# Patient Record
Sex: FEMALE | Race: WHITE | NOT HISPANIC OR LATINO | Employment: UNEMPLOYED | URBAN - METROPOLITAN AREA
[De-identification: names, ages, dates, MRNs, and addresses within clinical notes are randomized per-mention and may not be internally consistent; named-entity substitution may affect disease eponyms.]

---

## 2019-06-13 ENCOUNTER — OFFICE VISIT (OUTPATIENT)
Dept: SURGERY | Facility: CLINIC | Age: 49
End: 2019-06-13
Payer: MEDICARE

## 2019-06-13 VITALS
DIASTOLIC BLOOD PRESSURE: 74 MMHG | TEMPERATURE: 99 F | HEART RATE: 95 BPM | WEIGHT: 207 LBS | BODY MASS INDEX: 35.34 KG/M2 | HEIGHT: 64 IN | SYSTOLIC BLOOD PRESSURE: 118 MMHG

## 2019-06-13 DIAGNOSIS — K31.84 GASTROPARESIS: ICD-10-CM

## 2019-06-13 DIAGNOSIS — K44.9 HIATAL HERNIA: Primary | ICD-10-CM

## 2019-06-13 PROCEDURE — 3008F BODY MASS INDEX DOCD: CPT | Mod: CPTII,S$GLB,, | Performed by: SURGERY

## 2019-06-13 PROCEDURE — 99999 PR PBB SHADOW E&M-EST. PATIENT-LVL III: CPT | Mod: PBBFAC,,, | Performed by: SURGERY

## 2019-06-13 PROCEDURE — 99999 PR PBB SHADOW E&M-EST. PATIENT-LVL III: ICD-10-PCS | Mod: PBBFAC,,, | Performed by: SURGERY

## 2019-06-13 PROCEDURE — 99204 PR OFFICE/OUTPT VISIT, NEW, LEVL IV, 45-59 MIN: ICD-10-PCS | Mod: S$GLB,,, | Performed by: SURGERY

## 2019-06-13 PROCEDURE — 99204 OFFICE O/P NEW MOD 45 MIN: CPT | Mod: S$GLB,,, | Performed by: SURGERY

## 2019-06-13 PROCEDURE — 3008F PR BODY MASS INDEX (BMI) DOCUMENTED: ICD-10-PCS | Mod: CPTII,S$GLB,, | Performed by: SURGERY

## 2019-06-13 RX ORDER — DICYCLOMINE HYDROCHLORIDE 20 MG/1
TABLET ORAL
COMMUNITY
Start: 2019-05-30

## 2019-06-13 RX ORDER — CETIRIZINE HYDROCHLORIDE 10 MG/1
10 TABLET ORAL
COMMUNITY
Start: 2019-06-11 | End: 2019-06-18

## 2019-06-13 RX ORDER — FLUOXETINE HYDROCHLORIDE 20 MG/1
CAPSULE ORAL
Refills: 0 | COMMUNITY
Start: 2019-05-13

## 2019-06-13 RX ORDER — AZITHROMYCIN 250 MG/1
TABLET, FILM COATED ORAL
COMMUNITY

## 2019-06-13 RX ORDER — BUPROPION HYDROCHLORIDE 150 MG/1
TABLET ORAL
Refills: 0 | COMMUNITY
Start: 2019-03-24

## 2019-06-13 RX ORDER — DEXTROMETHORPHAN HYDROBROMIDE, GUAIFENESIN, AND PHENYLEPHRINE HYDROCHLORIDE 17.5; 385; 1 MG/1; MG/1; MG/1
TABLET ORAL
COMMUNITY

## 2019-06-13 RX ORDER — HYDROCORTISONE 1 %
CREAM (GRAM) TOPICAL
COMMUNITY
Start: 2019-06-11 | End: 2020-06-14

## 2019-06-13 RX ORDER — ASPIRIN 81 MG/1
81 TABLET ORAL
COMMUNITY
Start: 2018-10-04

## 2019-06-13 RX ORDER — LEVOTHYROXINE SODIUM 112 UG/1
TABLET ORAL
COMMUNITY

## 2019-06-13 RX ORDER — GABAPENTIN 100 MG/1
CAPSULE ORAL
COMMUNITY
Start: 2018-11-15

## 2019-06-13 RX ORDER — METHOCARBAMOL 500 MG/1
TABLET, FILM COATED ORAL
COMMUNITY
Start: 2019-05-15

## 2019-06-13 RX ORDER — TRIAZOLAM 0.25 MG/1
TABLET ORAL
Refills: 0 | COMMUNITY
Start: 2019-04-02

## 2019-06-13 RX ORDER — ATORVASTATIN CALCIUM 40 MG/1
TABLET, FILM COATED ORAL
COMMUNITY

## 2019-06-13 RX ORDER — FAMOTIDINE 40 MG/1
40 TABLET, FILM COATED ORAL
COMMUNITY
Start: 2019-06-11 | End: 2019-06-16

## 2019-06-13 RX ORDER — BUTALBITAL, ACETAMINOPHEN AND CAFFEINE 50; 325; 40 MG/1; MG/1; MG/1
TABLET ORAL
COMMUNITY
Start: 2019-04-15

## 2019-06-13 RX ORDER — AMOXICILLIN 500 MG/1
CAPSULE ORAL
COMMUNITY

## 2019-06-13 RX ORDER — CIPROFLOXACIN 500 MG/1
TABLET ORAL
COMMUNITY

## 2019-06-13 RX ORDER — BENZONATATE 100 MG/1
CAPSULE ORAL
COMMUNITY

## 2019-06-13 RX ORDER — POTASSIUM CHLORIDE 750 MG/1
TABLET, EXTENDED RELEASE ORAL
COMMUNITY
Start: 2019-03-22

## 2019-06-13 RX ORDER — PREDNISONE 20 MG/1
40 TABLET ORAL
COMMUNITY
Start: 2019-06-11 | End: 2019-06-14

## 2019-06-13 RX ORDER — MUPIROCIN 20 MG/G
OINTMENT TOPICAL
COMMUNITY
Start: 2018-11-09

## 2019-06-13 RX ORDER — NEOMYCIN SULFATE, POLYMYXIN B SULFATE, HYDROCORTISONE 3.5; 10000; 1 MG/ML; [USP'U]/ML; MG/ML
SOLUTION/ DROPS AURICULAR (OTIC)
COMMUNITY

## 2019-06-13 NOTE — PROGRESS NOTES
History & Physical    SUBJECTIVE:     History of Present Illness:  Patient is a 49 y.o. female presents with gastroparesis.  She is s/p trans thoracic heller and then s/p esophagectomy for end stage achalasia ([place], [place]).  She has had several procedures for recurrent type 4 hernias.  She had a gastric stimulator placed 1.5 years ago and is at high settings.  She believes she has a type 4 hernia with small intestine in the chest.  She has extremely severe epigastric pain, nausea and severe to extremely severe vomiting.  She takes phenergan 4 times a day, bentyl tid, carafate qid, ppi bid, ranitidine bid, buproprion, methocarbamol (muscle relaxant), gabapentin once a day and narcotics (sees a pain doctor).  Linzess for constipation and bowels are ok.  Things increasing her hernia risk are obesity and vomiting.  Her teeth have been removed due to trouble from vomiting and she is going through replacement.    She has a bmi of 35.53 with     No chief complaint on file.      Review of patient's allergies indicates:   Allergen Reactions    Metoclopramide hcl Other (See Comments)     Neck tightness      Ondansetron Other (See Comments) and Swelling     IV      Codeine Nausea And Vomiting    Blueberry Rash       Current Outpatient Medications   Medication Sig Dispense Refill    amitriptyline (ELAVIL) 10 MG tablet Take 10 mg by mouth nightly as needed for Insomnia.      aspirin (ECOTRIN) 81 MG EC tablet Take 81 mg by mouth.      cetirizine (ZYRTEC) 10 MG tablet Take 10 mg by mouth.      chlordiazepoxide-clidinium 5-2.5 mg (LIBRAX) 5-2.5 mg Cap Take 1 capsule by mouth 3 (three) times daily.      ciprofloxacin HCl (CIPRO) 500 MG tablet ciprofloxacin 500 mg tablet      clindamycin (CLEOCIN) 150 MG capsule Take 150 mg by mouth 3 (three) times daily.      dicyclomine (BENTYL) 20 mg tablet dicyclomine 20 mg tablet      docusate sodium (COLACE) 100 MG capsule Take 100 mg by mouth 2 (two) times daily.       famotidine (PEPCID) 40 MG tablet Take 40 mg by mouth.      FLUoxetine 20 MG capsule   0    gabapentin (NEURONTIN) 100 MG capsule gabapentin 100 mg capsule   Take 1 capsule 3 times a day by oral route.      hydrocodone-acetaminophen 7.5-325mg (NORCO) 7.5-325 mg per tablet 1 tablet by Per J Tube route every 6 (six) hours as needed for Pain. 8 tablet 0    hydrocortisone 1 % cream Apply to affected area 2 times daily      imipramine (TOFRANIL) 10 MG Tab Take 25 mg by mouth every evening.      levothyroxine (SYNTHROID) 112 MCG tablet Synthroid 112 mcg tablet   Take 1 tablet every day by oral route.      linaCLOtide (LINZESS) 72 mcg Cap capsule Linzess 72 mcg capsule   Take 1 capsule every day by oral route.      lorazepam (ATIVAN) 2 MG Tab Take 0.5 mg by mouth every 6 (six) hours as needed.      methocarbamol (ROBAXIN) 500 MG Tab       neomycin-polymyxin-hydrocortisone (CORTISPORIN) otic solution neomycin-polymyxin-hydrocort 3.5 mg/mL-10,000 unit/mL-1 % ear solution      pantoprazole (PROTONIX) 40 MG tablet Take 40 mg by mouth once daily.      phenylephrine-DM-guaifenesin 10-17.5-385 mg Tab Deconex DMX 10 mg-17.5 mg-385 mg tablet      potassium chloride (KLOR-CON) 10 MEQ TbSR potassium chloride ER 10 mEq tablet,extended release      predniSONE (DELTASONE) 20 MG tablet Take 40 mg by mouth.      promethazine (PHENERGAN) 25 MG suppository Place 1 suppository (25 mg total) rectally every 6 (six) hours as needed for Nausea. 5 suppository 0    sucralfate (CARAFATE) 1 gram tablet Take 1 g by mouth 4 (four) times daily.      triazolam (HALCION) 0.25 MG Tab   0    VITAMIN B COMPLEX ORAL 1 tablet by FEEDING TUBE route.      amoxicillin (AMOXIL) 500 MG capsule amoxicillin 500 mg capsule      atorvastatin (LIPITOR) 40 MG tablet atorvastatin 40 mg tablet      azithromycin (Z-NATHAN) 250 MG tablet azithromycin 250 mg tablet      benzonatate (TESSALON) 100 MG capsule benzonatate 100 mg capsule   Take 1 capsule 3  times a day by oral route.      buPROPion (WELLBUTRIN XL) 150 MG TB24 tablet   0    butalbital-acetaminophen-caffeine -40 mg (FIORICET, ESGIC) -40 mg per tablet TAKE 1 TABLET BY MOUTH EVERY 6 HOURS AS NEEDED UP TO 10 DAYS      mupirocin (BACTROBAN) 2 % ointment APPLY  OINTMENT TOPICALLY TO AFFECTED AREA THREE TIMES DAILY FOR 7 DAYS      promethazine (PHENERGAN) 6.25 mg/5 mL syrup 20 mLs (25 mg total) by Per J Tube route every 6 (six) hours as needed for Nausea. 473 mL 0    tapentadol (NUCYNTA) 50 mg Tab Nucynta 50 mg tablet      tramadol (ULTRAM) 50 mg tablet Take 50 mg by mouth every 6 (six) hours as needed for Pain.       No current facility-administered medications for this visit.        Past Medical History:   Diagnosis Date    Anxiety     Depression     Gastroparesis     GERD (gastroesophageal reflux disease)     Hypothyroidism      Past Surgical History:   Procedure Laterality Date    CHOLECYSTECTOMY      esophagectomy      feeding tube and removal      GASTRIC STIMULATOR IMPLANT SURGERY      HIATAL HERNIA REPAIR      transthoracic heller myotomy       Family History   Problem Relation Age of Onset    Heart disease Mother     Heart disease Father      Social History     Tobacco Use    Smoking status: Never Smoker   Substance Use Topics    Alcohol use: No    Drug use: No        Review of Systems:  Review of Systems   Constitutional: Positive for fatigue. Negative for fever.   Respiratory: Positive for cough. Negative for chest tightness and shortness of breath.         Cough due to reflux   Cardiovascular: Positive for chest pain. Negative for palpitations.        Cp due to reflux   Genitourinary: Negative for difficulty urinating and dysuria.   Musculoskeletal: Positive for back pain and neck pain.   Skin: Positive for rash. Negative for wound.        Has itchy rash   Neurological: Positive for headaches. Negative for seizures.   Hematological:        Has easy bruising but no  "easy bleeding       OBJECTIVE:     Vital Signs (Most Recent)  Temp: 98.6 °F (37 °C) (06/13/19 1541)  Pulse: 95 (06/13/19 1541)  BP: 118/74 (06/13/19 1541)  5' 4" (1.626 m)  93.9 kg (207 lb)     Physical Exam:  Physical Exam   Constitutional: She is oriented to person, place, and time. She appears well-developed and well-nourished.   Neck: Normal range of motion. Neck supple.   Cardiovascular: Normal rate, regular rhythm and normal heart sounds.   Pulmonary/Chest: Effort normal and breath sounds normal.   Abdominal: Soft. Bowel sounds are normal. There is no tenderness.   Neurological: She is alert and oriented to person, place, and time.   Skin: Skin is warm and dry.   Psychiatric: She has a normal mood and affect. Her behavior is normal. Judgment and thought content normal.   Vitals reviewed.    Gastric Stimulator Interrogation: impedence 481 Voltage 10.5 Current 21.8 Pulse [id number] Rate 14 Cycle on 3 Cycle off 2      Laboratory  CBC: Reviewed  CMP: Reviewed    Diagnostic Results:  CT: Reviewed    ASSESSMENT/PLAN:     S/p esophagectomy for achalasia with multiply recurrent type 4 hh, severe obesity, gastroparesis s/p gastric stimulator, currently functioning well at high settings but not much improvement in symptoms.    PLAN:       Consult thoracic surgery.  I suggest weight loss and she should see nutrition.  She needs to get off narcotics.         "

## 2019-06-13 NOTE — LETTER
[facility] - General Surgery  [address]  [facility] [address]  Phone: [phone] June 13, 2019      López Grossman MD  [address] [facility] [address]  [facility] [address]    Patient: Janelle Tellez   MR Number: [id number]   YOB: 1970   Date of Visit: 6/13/2019     Dear Dr. Grossman:    Thank you for referring Janelle Tellez to me for evaluation. Attached you will find relevant portions of my assessment and plan of care.    Ms. Tellez is status post esophagectomy for achalasia with multiply recurrent type 4 hiatal hernia, severe obesity, gastroparesis status post gastric stimulator, currently functioning well at high settings but not much improvement in symptoms.    We will consult thoracic surgery.  I suggest weight loss and she should see nutrition.  She needs to get off narcotics.    If you have questions, please do not hesitate to call me. I look forward to following Janelle Tellez along with you.    Sincerely,      Kyle Middleton MD  Professor, [employer]  Section Head, General Surgery  [facility]    WSR/afw    CC  Venu Wooten MD

## 2019-06-13 NOTE — Clinical Note
June 13, 2019      López Grossman MD  [address] [facility] [address]  [facility] [address]           [facility] - General Surgery  [address]  [place] [address]  Phone: [phone]          Patient: Janelle Tellez   MR Number: [id number]   YOB: 1970   Date of Visit: 6/13/2019       Dear Dr. López Grossman:    Thank you for referring Janelle Tellez to me for evaluation. Attached you will find relevant portions of my assessment and plan of care.    If you have questions, please do not hesitate to call me. I look forward to following Janelle Tellez along with you.    Sincerely,    Kyle Middleton MD    Enclosure  CC:  No Recipients    If you would like to receive this communication electronically, please contact [email or web address] or [phone] to request more information on [employer] access.    For providers and/or their staff who would like to refer a patient to [facility], please contact us through our one-stop-shop provider referral line, [facility], at [phone].    If you feel you have received this communication in error or would no longer like to receive these types of communications, please e-mail [email or web address]

## 2019-06-14 DIAGNOSIS — K31.84 GASTROPARESIS: Primary | ICD-10-CM

## 2019-06-25 ENCOUNTER — HOSPITAL ENCOUNTER (OUTPATIENT)
Dept: RADIOLOGY | Facility: HOSPITAL | Age: 49
Discharge: HOME | End: 2019-06-25
Attending: THORACIC SURGERY (CARDIOTHORACIC VASCULAR SURGERY)
Payer: MEDICARE

## 2019-06-25 ENCOUNTER — OFFICE VISIT (OUTPATIENT)
Dept: CARDIOTHORACIC SURGERY | Facility: CLINIC | Age: 49
End: 2019-06-25
Payer: MEDICARE

## 2019-06-25 VITALS
DIASTOLIC BLOOD PRESSURE: 84 MMHG | WEIGHT: 207 LBS | BODY MASS INDEX: 35.53 KG/M2 | TEMPERATURE: 99 F | SYSTOLIC BLOOD PRESSURE: 118 MMHG | HEART RATE: 99 BPM

## 2019-06-25 DIAGNOSIS — K31.84 GASTROPARESIS: ICD-10-CM

## 2019-06-25 DIAGNOSIS — Z98.890 HISTORY OF ESOPHAGECTOMY: ICD-10-CM

## 2019-06-25 DIAGNOSIS — K44.9 PARAESOPHAGEAL HERNIA: Primary | ICD-10-CM

## 2019-06-25 DIAGNOSIS — Z90.49 HISTORY OF ESOPHAGECTOMY: ICD-10-CM

## 2019-06-25 PROCEDURE — 99999 PR PBB SHADOW E&M-EST. PATIENT-LVL V: ICD-10-PCS | Mod: PBBFAC,,, | Performed by: THORACIC SURGERY (CARDIOTHORACIC VASCULAR SURGERY)

## 2019-06-25 PROCEDURE — 3008F BODY MASS INDEX DOCD: CPT | Mod: CPTII,S$GLB,, | Performed by: THORACIC SURGERY (CARDIOTHORACIC VASCULAR SURGERY)

## 2019-06-25 PROCEDURE — 99205 PR OFFICE/OUTPT VISIT, NEW, LEVL V, 60-74 MIN: ICD-10-PCS | Mod: S$GLB,,, | Performed by: THORACIC SURGERY (CARDIOTHORACIC VASCULAR SURGERY)

## 2019-06-25 PROCEDURE — 99999 PR PBB SHADOW E&M-EST. PATIENT-LVL V: CPT | Mod: PBBFAC,,, | Performed by: THORACIC SURGERY (CARDIOTHORACIC VASCULAR SURGERY)

## 2019-06-25 PROCEDURE — 71250 CT THORAX DX C-: CPT | Mod: TC

## 2019-06-25 PROCEDURE — 99205 OFFICE O/P NEW HI 60 MIN: CPT | Mod: S$GLB,,, | Performed by: THORACIC SURGERY (CARDIOTHORACIC VASCULAR SURGERY)

## 2019-06-25 PROCEDURE — 3008F PR BODY MASS INDEX (BMI) DOCUMENTED: ICD-10-PCS | Mod: CPTII,S$GLB,, | Performed by: THORACIC SURGERY (CARDIOTHORACIC VASCULAR SURGERY)

## 2019-06-25 NOTE — LETTER
June 27, 2019      Kyle Middleton MD  [address]  [facility] [address]            [facility]  [address] [place] [address]  Phone: [phone]  Fax: [phone]          Patient: Janelle Tellez   MR Number: [id number]   YOB: 1970   Date of Visit: 6/25/2019       Dear Dr. Kyle Middleton:    Thank you for referring Janelle Tellez to me for evaluation. Attached you will find relevant portions of my assessment and plan of care.    If you have questions, please do not hesitate to call me. I look forward to following Janelle Tellez along with you.    Sincerely,    Benjamin Gage MD    Enclosure  CC:  No Recipients    If you would like to receive this communication electronically, please contact [email or web address] or [phone] to request more information on [employer] access.    For providers and/or their staff who would like to refer a patient to [facility], please contact us through our one-stop-shop provider referral line, [facility] , at [phone].    If you feel you have received this communication in error or would no longer like to receive these types of communications, please e-mail [email or web address]

## 2019-06-25 NOTE — PROGRESS NOTES
History & Physical    SUBJECTIVE:     History of Present Illness:  49 year old female with PMH of hypothyroidism, obesity, HLD, GERD, end stage achalasia, gastroparesis and multiple upper GI surgeries presents for evaluation of recurrent hiatal hernia. Initial heller myotomy and hiatal hernia repair was 30 years ago performed via left posterior thoracotomy. In February 2016, she had a robotic 3 hole esophagectomy for end stage achalasia. She has also undergone several transabdominal repairs of recurrent type 4 hiatal hernias. About 1.5 years ago she had a gastric stimulator placed for gastroparesis in [place]. Today she reports persistent epigastric pain, dysphagia, reflux and vomiting. Currently she reports about 1 episode of emesis a day. She is able to tolerate liquids and most solid foods as long as she eats very small portions. Eats frequent small portions throughout the day. She had a J tube after esophagectomy but it was removed about a year ago. She takes phenergan 4 times a day, bentyl tid, carafate qid, ppi bid, ranitidine bid, buproprion, methocarbamol, gabapentin once a day and narcotics. Follows with pain management. Takes Linzess for constipation related to narcotic use and reports normal bowel functioning. She recently had oral surgery to have all of her teeth removed due to deterioration from vomiting and antacid use. In the process of getting dentures. More recently she fractured her right fibula then injured her left foot after two separate falls. Wearing bilateral boots today. Takes 81mg ASA. No prior cardiac procedures or surgeries.     PSH as above and cholecystectomy.  Never smoker. Denies EtOH use.     Chief Complaint   Patient presents with    Consult       Review of patient's allergies indicates:   Allergen Reactions    Metoclopramide hcl Other (See Comments)     Neck tightness      Ondansetron Other (See Comments) and Swelling     IV      Codeine Nausea And Vomiting    Blueberry Rash        Current Outpatient Medications   Medication Sig Dispense Refill    amitriptyline (ELAVIL) 10 MG tablet Take 10 mg by mouth nightly as needed for Insomnia.      amoxicillin (AMOXIL) 500 MG capsule amoxicillin 500 mg capsule      aspirin (ECOTRIN) 81 MG EC tablet Take 81 mg by mouth.      atorvastatin (LIPITOR) 40 MG tablet atorvastatin 40 mg tablet      azithromycin (Z-NATHAN) 250 MG tablet azithromycin 250 mg tablet      benzonatate (TESSALON) 100 MG capsule benzonatate 100 mg capsule   Take 1 capsule 3 times a day by oral route.      buPROPion (WELLBUTRIN XL) 150 MG TB24 tablet   0    butalbital-acetaminophen-caffeine -40 mg (FIORICET, ESGIC) -40 mg per tablet TAKE 1 TABLET BY MOUTH EVERY 6 HOURS AS NEEDED UP TO 10 DAYS      chlordiazepoxide-clidinium 5-2.5 mg (LIBRAX) 5-2.5 mg Cap Take 1 capsule by mouth 3 (three) times daily.      ciprofloxacin HCl (CIPRO) 500 MG tablet ciprofloxacin 500 mg tablet      clindamycin (CLEOCIN) 150 MG capsule Take 150 mg by mouth 3 (three) times daily.      dicyclomine (BENTYL) 20 mg tablet dicyclomine 20 mg tablet      docusate sodium (COLACE) 100 MG capsule Take 100 mg by mouth 2 (two) times daily.      FLUoxetine 20 MG capsule   0    gabapentin (NEURONTIN) 100 MG capsule gabapentin 100 mg capsule   Take 1 capsule 3 times a day by oral route.      hydrocodone-acetaminophen 7.5-325mg (NORCO) 7.5-325 mg per tablet 1 tablet by Per J Tube route every 6 (six) hours as needed for Pain. 8 tablet 0    hydrocortisone 1 % cream Apply to affected area 2 times daily      imipramine (TOFRANIL) 10 MG Tab Take 25 mg by mouth every evening.      levothyroxine (SYNTHROID) 112 MCG tablet Synthroid 112 mcg tablet   Take 1 tablet every day by oral route.      linaCLOtide (LINZESS) 72 mcg Cap capsule Linzess 72 mcg capsule   Take 1 capsule every day by oral route.      lorazepam (ATIVAN) 2 MG Tab Take 0.5 mg by mouth every 6 (six) hours as needed.      methocarbamol  (ROBAXIN) 500 MG Tab       mupirocin (BACTROBAN) 2 % ointment APPLY  OINTMENT TOPICALLY TO AFFECTED AREA THREE TIMES DAILY FOR 7 DAYS      neomycin-polymyxin-hydrocortisone (CORTISPORIN) otic solution neomycin-polymyxin-hydrocort 3.5 mg/mL-10,000 unit/mL-1 % ear solution      pantoprazole (PROTONIX) 40 MG tablet Take 40 mg by mouth once daily.      phenylephrine-DM-guaifenesin 10-17.5-385 mg Tab Deconex DMX 10 mg-17.5 mg-385 mg tablet      potassium chloride (KLOR-CON) 10 MEQ TbSR potassium chloride ER 10 mEq tablet,extended release      promethazine (PHENERGAN) 25 MG suppository Place 1 suppository (25 mg total) rectally every 6 (six) hours as needed for Nausea. 5 suppository 0    promethazine (PHENERGAN) 6.25 mg/5 mL syrup 20 mLs (25 mg total) by Per J Tube route every 6 (six) hours as needed for Nausea. 473 mL 0    sucralfate (CARAFATE) 1 gram tablet Take 1 g by mouth 4 (four) times daily.      tapentadol (NUCYNTA) 50 mg Tab Nucynta 50 mg tablet      tramadol (ULTRAM) 50 mg tablet Take 50 mg by mouth every 6 (six) hours as needed for Pain.      triazolam (HALCION) 0.25 MG Tab   0    VITAMIN B COMPLEX ORAL 1 tablet by FEEDING TUBE route.      cetirizine (ZYRTEC) 10 MG tablet Take 10 mg by mouth.      famotidine (PEPCID) 40 MG tablet Take 40 mg by mouth.       No current facility-administered medications for this visit.        Past Medical History:   Diagnosis Date    Anxiety     Depression     Gastroparesis     GERD (gastroesophageal reflux disease)     Hypothyroidism      Past Surgical History:   Procedure Laterality Date    CHOLECYSTECTOMY      esophagectomy      feeding tube and removal      GASTRIC STIMULATOR IMPLANT SURGERY      HIATAL HERNIA REPAIR      transthoracic heller myotomy       Family History   Problem Relation Age of Onset    Heart disease Mother     Heart disease Father      Social History     Tobacco Use    Smoking status: Never Smoker   Substance Use Topics     Alcohol use: No    Drug use: No        Review of Systems:  Review of Systems   Constitutional: Positive for activity change, appetite change and fatigue. Negative for fever.   HENT: Positive for trouble swallowing. Negative for congestion and voice change.    Eyes: Negative for visual disturbance.   Respiratory: Positive for choking. Negative for chest tightness, shortness of breath and wheezing.    Cardiovascular: Negative for chest pain, palpitations and leg swelling.   Gastrointestinal: Positive for abdominal pain, nausea and vomiting. Negative for constipation and diarrhea.   Genitourinary: Negative for difficulty urinating.   Musculoskeletal: Negative for back pain and neck pain.   Neurological: Positive for weakness. Negative for syncope, light-headedness, numbness and headaches.   Psychiatric/Behavioral: Negative for confusion.       OBJECTIVE:     Vital Signs (Most Recent)  Temp: 98.8 °F (37.1 °C) (06/25/19 1002)  Pulse: 99 (06/25/19 1002)  BP: 118/84 (06/25/19 1002)     93.9 kg (207 lb)   Body mass index is 35.53 kg/m².    ECOG Performance Scale:  2 - Symptomatic, <50% confined to bed    Physical Exam:  Physical Exam   Constitutional: She is oriented to person, place, and time. Vital signs are normal. She appears well-developed and well-nourished.   Obese   HENT:   Head: Normocephalic.   Mouth/Throat: Oropharynx is clear and moist.   Eyes: Conjunctivae and EOM are normal.   Neck: Normal range of motion.   Cardiovascular: Normal rate, regular rhythm, normal heart sounds and intact distal pulses.   Pulmonary/Chest: Effort normal and breath sounds normal. She has no wheezes.   Abdominal: Soft. Bowel sounds are normal. She exhibits no distension. There is tenderness.   Multiple well healed lap sites   Musculoskeletal: She exhibits no edema.   Lymphadenopathy:     She has no cervical adenopathy.   Neurological: She is alert and oriented to person, place, and time.   Skin: Skin is warm.   Psychiatric: She has a  normal mood and affect.     Diagnostic Results:    4/17/19- CT Abdomen and Pelvis-   1.  In comparison with the previous study, some fluid is now seen within the cecum and ascending colon and there is questionable mild wall thickening in the proximal transverse colon. The findings are nonspecific but could be associated with diarrhea and/or a nonspecific colitis. There is no associated mesenteric induration.  2.  Mild distal colonic diverticulosis, with no diverticulitis.  3.  7 x 8 mm left ovarian follicle with a 1.4 x 1.3 cm right ovarian cyst, with no free fluid identified in the pelvis.  4.  Riedel's configuration of the right hepatic lobe with diffuse fatty infiltration of the liver.  5.  Status post esophagectomy and gastric pull-through procedure with a gastric pacer device in place at the gastric antrum.  6.  Interval healing of the skin defect along the left anterior abdominal wall from the former enterostomy tube since the prior CT.    6/25/19- CT Chest- Evidence of prior esophagectomy with gastric pull-through.  Mild fluid distention of the stomach in the posterior right mediastinum is similar to prior study of 09/13/2016.  Compared to 09/13/2016, there has been interval reduction in herniation of transverse colon into the hiatal hernia. Now, there is suggestion of herniation of an approximately 10 cm loop of collapsed small bowel into the hiatal hernia (series 3, image 330). No bowel obstruction. Gastric stimulator device also noted.  3 cm midline supraumbilical ventral hernia containing a loop of small bowel, protruding through a 1.7 cm wide neck.  No bowel obstruction or inflammation.    ASSESSMENT/PLAN:     49 year old female with PMH of hypothyroidism, obesity, HLD, GERD, end stage achalasia, gastroparesis and multiple upper GI surgeries presents for evaluation of recurrent hiatal hernia.     PLAN:Plan   Recommend upper GI with small bowel follow through to further evaluate small portion of small  bowel that has herniated into chest.   Transthoracic repair of hernia carries significant morbidity and mortality given prior bilateral thoracic surgeries, including a Abraham esophagectomy.     ATTENDING ATTESTATION:    I evaluated the patient and I agree with the assessment and plan.  Complicated surgical history, now with small bowel herniated into mediastinum adjacent to gastric conduit.  This would likely be approached best through the abdomen despite history of multiple abdominal operations and hiatal hernia repairs.  She has had a left thoracotomy for her initial esophagomyotomy and right thoracoscopic esophageal mobilization for Abraham esophagogastrectomy.  Will obtain UGI with SBFT to better evaluate.

## 2019-06-27 PROBLEM — Z90.49 HISTORY OF ESOPHAGECTOMY: Chronic | Status: ACTIVE | Noted: 2019-06-27

## 2019-06-27 PROBLEM — K44.9 PARAESOPHAGEAL HERNIA: Status: ACTIVE | Noted: 2019-06-27

## 2019-06-27 PROBLEM — Z98.890 HISTORY OF ESOPHAGECTOMY: Chronic | Status: ACTIVE | Noted: 2019-06-27

## 2019-07-02 ENCOUNTER — TELEPHONE (OUTPATIENT)
Dept: CARDIOTHORACIC SURGERY | Facility: CLINIC | Age: 49
End: 2019-07-02

## 2019-07-02 NOTE — TELEPHONE ENCOUNTER
Patient notified of the rescheduled Upper GI.  Patient instructed to fast 8 hours prior to test.    ----- Message from Amie Ross sent at 7/2/2019  7:31 AM CDT -----  Contact: self  Pt called in about wanting to rs appt. Pt cancelled appt. Pt would like the nurse to give her a call back        Pt can be reached at [phone] ty

## 2019-07-05 ENCOUNTER — HOSPITAL ENCOUNTER (OUTPATIENT)
Dept: RADIOLOGY | Facility: HOSPITAL | Age: 49
Discharge: HOME | End: 2019-07-05
Attending: PHYSICIAN ASSISTANT
Payer: MEDICARE

## 2019-07-05 DIAGNOSIS — Z98.890 HISTORY OF ESOPHAGECTOMY: ICD-10-CM

## 2019-07-05 DIAGNOSIS — Z90.49 HISTORY OF ESOPHAGECTOMY: ICD-10-CM

## 2019-07-05 PROCEDURE — 25500020 PHARM REV CODE 255: Performed by: PHYSICIAN ASSISTANT

## 2019-07-05 PROCEDURE — A9698 NON-RAD CONTRAST MATERIALNOC: HCPCS | Performed by: PHYSICIAN ASSISTANT

## 2019-07-05 PROCEDURE — 74245 FL UPPER GI WITH SMALL BOWEL: CPT | Mod: TC

## 2019-07-05 RX ADMIN — Medication 20 ML: at 04:07
